# Patient Record
Sex: FEMALE | Race: WHITE | NOT HISPANIC OR LATINO
[De-identification: names, ages, dates, MRNs, and addresses within clinical notes are randomized per-mention and may not be internally consistent; named-entity substitution may affect disease eponyms.]

---

## 2024-04-08 PROBLEM — Z00.00 ENCOUNTER FOR PREVENTIVE HEALTH EXAMINATION: Status: ACTIVE | Noted: 2024-04-08

## 2024-04-10 ENCOUNTER — APPOINTMENT (OUTPATIENT)
Dept: ORTHOPEDIC SURGERY | Facility: CLINIC | Age: 59
End: 2024-04-10
Payer: OTHER MISCELLANEOUS

## 2024-04-10 VITALS — BODY MASS INDEX: 25.52 KG/M2 | HEIGHT: 63 IN | WEIGHT: 144 LBS

## 2024-04-10 DIAGNOSIS — Z86.39 PERSONAL HISTORY OF OTHER ENDOCRINE, NUTRITIONAL AND METABOLIC DISEASE: ICD-10-CM

## 2024-04-10 DIAGNOSIS — K90.0 CELIAC DISEASE: ICD-10-CM

## 2024-04-10 DIAGNOSIS — Z78.9 OTHER SPECIFIED HEALTH STATUS: ICD-10-CM

## 2024-04-10 DIAGNOSIS — Q87.19 OTHER CONGEN MALFORMATION SYNDROM: ICD-10-CM

## 2024-04-10 DIAGNOSIS — S92.355A NONDISPLACED FRACTURE OF FIFTH METATARSAL BONE, LEFT FOOT, INITIAL ENCOUNTER FOR CLOSED FRACTURE: ICD-10-CM

## 2024-04-10 PROCEDURE — 28470 CLTX METATARSAL FX WO MNP EA: CPT

## 2024-04-10 PROCEDURE — 99204 OFFICE O/P NEW MOD 45 MIN: CPT

## 2024-04-10 RX ORDER — LEVOTHYROXINE SODIUM 137 UG/1
TABLET ORAL
Refills: 0 | Status: ACTIVE | COMMUNITY

## 2024-04-10 RX ORDER — ROSUVASTATIN CALCIUM 5 MG/1
TABLET, FILM COATED ORAL
Refills: 0 | Status: ACTIVE | COMMUNITY

## 2024-04-10 NOTE — ASSESSMENT
[FreeTextEntry1] : 60 yo female presenitng with left 5th pseudojones fracture. Recommend non-surgical management -RLE WBAT in short cam boot, rx given today -Activities as tolerated, avoid strenuous/impact realted activities -work note given today -Rest, ice, compression, elevation, NSAIDs PRN for pain.  -All questions answered -F/u 6 weeks with repeat x-rays  The diagnosis was explained in detail. The potential non-surgical and surgical treatments were reviewed. The relative risks and benefits of each option were considered relative to the patients age, activity level, medical history, symptom severity and previously attempted treatments.  The patient was advised to consult with their primary medical provider prior to initiation of any new medications to reduce the risk of adverse effects specific to their long-term home medications and medical history. The risk of gastrointestinal irritation and kidney injury specific to long-term NSAID use was discussed.  Entered by Brandt Herrera PA-C acting as scribe. Dr. Padron Attestation The documentation recorded by the scribe, in my presence, accurately reflects the service I, Dr. Padron, personally performed, and the decisions made by me with my edits as appropriate.

## 2024-04-10 NOTE — HISTORY OF PRESENT ILLNESS
[Work related] : work related [Sudden] : sudden [Dull/Aching] : dull/aching [Throbbing] : throbbing [Household chores] : household chores [Leisure] : leisure [Social interactions] : social interactions [Rest] : rest [6] : 6 [3] : 3 [Constant] : constant [Full time] : Work status: full time [de-identified] : Patient here for left foot. Patient states she was placing a tray on a lab table and lost her balance and fell on 4/5/24. Patient states she went to [facility] on 4/5/24 for x-ray. Patient states she has a constant dull aching pain along the 5th metatarsal. Patient came into office weight bearing with one crutch.  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

## 2024-04-10 NOTE — PHYSICAL EXAM
[de-identified] : Examination of the left foot and ankle is as follows: Inspection: swelling, ecchymosis of foot, moderate swelling of dorsolateral foot, but no abrasion, laceration, no erythema Palpation: 5th metatarsal base tenderness ROM: plantarflexion 20 degrees, inversion 15 degrees, eversion 10 degrees, dorsiflexion 10 degrees Strength: dorsiflexion 4/5, plantar flexion 4/5, inversion 4/5, eversion 4/5, EHL 5/5, FHL 5/5 Vascular: dorsalis pedis pulse: 2+, posterior tibialis pulse: 2+ Neuro: Sensation present to light touch in all distributions Gait: antalgic, LLE short leg splint, patient ambulates with axillary crutches  X-rays of the left foot is as follows: outside x-rays from [facility] Foot 3 view: closed, nondisplaced 5th metatarsal base fracture

## 2024-05-21 ENCOUNTER — APPOINTMENT (OUTPATIENT)
Dept: ORTHOPEDIC SURGERY | Facility: CLINIC | Age: 59
End: 2024-05-21
Payer: OTHER MISCELLANEOUS

## 2024-05-21 VITALS — BODY MASS INDEX: 25.34 KG/M2 | HEIGHT: 63 IN | WEIGHT: 143 LBS

## 2024-05-21 PROCEDURE — 99024 POSTOP FOLLOW-UP VISIT: CPT

## 2024-05-21 PROCEDURE — 73630 X-RAY EXAM OF FOOT: CPT | Mod: LT

## 2024-05-21 NOTE — PHYSICAL EXAM
[de-identified] : Examination of the left foot and ankle is as follows: Inspection: mild swelling, resolving ecchymosis of foot, no abrasion, laceration, no erythema Palpation: mild 5th metatarsal base tenderness ROM: plantarflexion 40 degrees, inversion 15 degrees, eversion 10 degrees, dorsiflexion 10 degrees Strength: dorsiflexion 4/5, plantar flexion 4/5, inversion 4/5, eversion 4/5, EHL 5/5, FHL 5/5 Vascular: dorsalis pedis pulse: 2+, posterior tibialis pulse: 2+ Neuro: Sensation present to light touch in all distributions Gait: antalgic, LLE in short [place] X-rays of the left foot is as follows: outside x-rays from [facility] Foot 3 view: healing minimally displaced 5th metatarsal base fracture in acceptable alignment

## 2024-05-21 NOTE — ASSESSMENT
[FreeTextEntry1] : 60 yo female presenitng with left 5th pseudojones fracture. xrays today show interval bone healing in acceptable alignment.  Recommend non-surgical management -RLE WBAT in short cam boot -Activities as tolerated, avoid strenuous/impact realted activities -Rest, ice, compression, elevation, NSAIDs PRN for pain.  -All questions answered -F/u 6 weeks with repeat x-rays  The diagnosis was explained in detail. The potential non-surgical and surgical treatments were reviewed. The relative risks and benefits of each option were considered relative to the patients age, activity level, medical history, symptom severity and previously attempted treatments.  The patient was advised to consult with their primary medical provider prior to initiation of any new medications to reduce the risk of adverse effects specific to their long-term home medications and medical history. The risk of gastrointestinal irritation and kidney injury specific to long-term NSAID use was discussed.

## 2024-05-21 NOTE — HISTORY OF PRESENT ILLNESS
[Work related] : work related [Sudden] : sudden [3] : 3 [Dull/Aching] : dull/aching [Throbbing] : throbbing [Constant] : constant [Household chores] : household chores [Leisure] : leisure [Social interactions] : social interactions [Rest] : rest [Full time] : Work status: full time [de-identified] : Patient here for left foot. Patient states she was placing a tray on a lab table and lost her balance and fell on 4/5/24. Patient states she is still having some pain. Patient reports her ankle is bothering her now. Patient is wearing Cam boot.  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

## 2024-07-02 ENCOUNTER — APPOINTMENT (OUTPATIENT)
Dept: ORTHOPEDIC SURGERY | Facility: CLINIC | Age: 59
End: 2024-07-02
Payer: OTHER MISCELLANEOUS

## 2024-07-02 DIAGNOSIS — S92.352D DISPLACED FRACTURE OF FIFTH METATARSAL BONE, LEFT FOOT, SUBSEQUENT ENCOUNTER FOR FRACTURE WITH ROUTINE HEALING: ICD-10-CM

## 2024-07-02 PROCEDURE — 99024 POSTOP FOLLOW-UP VISIT: CPT

## 2024-07-02 PROCEDURE — 73630 X-RAY EXAM OF FOOT: CPT | Mod: LT

## 2024-07-16 ENCOUNTER — TRANSCRIPTION ENCOUNTER (OUTPATIENT)
Age: 59
End: 2024-07-16